# Patient Record
(demographics unavailable — no encounter records)

---

## 2024-11-05 NOTE — ASSESSMENT
[FreeTextEntry1] : //  Healthcare Maintenance -Advise Yearly Skin cancer screening with Dermatologist  -Advise Yearly Eye exam with Ophthalmologist -Advise Yearly Dental exam -Educated of the importance of Healthy diet, such as Mediterranean Diet and Exercise, such as walking >20 minutes a day and increasing gradually as tolerated  Immunizations -Flu vaccine -declined  -Covid vaccine

## 2024-11-05 NOTE — HEALTH RISK ASSESSMENT
[Good] : ~his/her~  mood as  good [No] : In the past 12 months have you used drugs other than those required for medical reasons? No [No falls in past year] : Patient reported no falls in the past year [0] : 2) Feeling down, depressed, or hopeless: Not at all (0) [PHQ-2 Negative - No further assessment needed] : PHQ-2 Negative - No further assessment needed [[id number]] : 0 [Never] : Never [Change in mental status noted] : No change in mental status noted [Fully functional (bathing, dressing, toileting, transferring, walking, feeding)] : Fully functional (bathing, dressing, toileting, transferring, walking, feeding) [Fully functional (using the telephone, shopping, preparing meals, housekeeping, doing laundry, using] : Fully functional and needs no help or supervision to perform IADLs (using the telephone, shopping, preparing meals, housekeeping, doing laundry, using transportation, managing medications and managing finances) [Reports changes in hearing] : Reports no changes in hearing [Reports changes in vision] : Reports no changes in vision

## 2024-11-05 NOTE — HISTORY OF PRESENT ILLNESS
[de-identified] : 29 year old male presents for annual exam.  now working days.  better diet and exercising more diet- eating out majority.  no soda, juices.  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

## 2025-04-23 NOTE — HISTORY OF PRESENT ILLNESS
[FreeTextEntry1] : fu warts  [de-identified] : 30yM patient presenting for f/u:   # Wart on L hand treated with cryo at  in summer 2024. initially improved but now with several smaller warts in the same spot. has also tried OTC sal acid without improvement.  # itchy bumps on R 5th finger that comes and goes over the years.   No personal hx of skin cancer Family hx: Possible NMSC in younger sister who uses tanning beds

## 2025-04-23 NOTE — PHYSICAL EXAM
[FreeTextEntry3] : verrucous papules of the L dorsal hand 4 coalescing on L dorsal thumb tapioca zayra vesicles on R 5th finger   used

## 2025-04-23 NOTE — ASSESSMENT
[FreeTextEntry1] : # Verruca x4 coalescing papules; L dorsal hand - Discussed nature of condition and treatment options - Treated with cryotherapy at today's visit Procedure note: Cryotherapy  Verbal consent obtained. The risks/benefits/alternatives discussed including but not limited to risk of pain, inflammatory and blistering reaction, infection, risk of permanent scar and/or dyspigmentation, recurrence, possible lack of efficacy and need for repeat treatments. Site confirmed. 4 lesions treated with cryotherapy: liquid nitrogen x2 rapid freeze-slow thaw cycles. Discussed wound care instructions. Patient tolerated well with no immediate complications.  - Once healed from above, start imiquimod to AA TIW at bedtime. wash off in am, SED. Alternate with wart stick (sal acid) on other days.   # Dyshidrotic Eczema - chronic; exacerbation, R hand - orientation provided about nature of condition, treatment expectations, alternatives, risks and benefits - Dry skin care instructions/information with OTC product recommendations (ex: Cerave, Vaseline)  Creams and ointments recommended over lotions. Gentle hand care - Start clobetasol ointment BID to affected areas for up to 2 weeks on/1 week OFF cycles. Repeat cycles as needed. SED.  - long term steroid use side effects such as skin atrophy, hypopigmentation and telangiectasis discussed - patient agrees to use topical steroids sparingly and as prescribed    RTC 6 weeks for repeat cryo or prn